# Patient Record
Sex: FEMALE | Race: WHITE | NOT HISPANIC OR LATINO | Employment: FULL TIME | URBAN - NONMETROPOLITAN AREA
[De-identification: names, ages, dates, MRNs, and addresses within clinical notes are randomized per-mention and may not be internally consistent; named-entity substitution may affect disease eponyms.]

---

## 2023-03-22 ENCOUNTER — OFFICE VISIT (OUTPATIENT)
Dept: PRIMARY CARE | Facility: CLINIC | Age: 38
End: 2023-03-22
Payer: COMMERCIAL

## 2023-03-22 VITALS
DIASTOLIC BLOOD PRESSURE: 80 MMHG | BODY MASS INDEX: 47.91 KG/M2 | HEART RATE: 86 BPM | SYSTOLIC BLOOD PRESSURE: 120 MMHG | WEIGHT: 280.6 LBS | OXYGEN SATURATION: 98 % | HEIGHT: 64 IN | TEMPERATURE: 96.9 F

## 2023-03-22 DIAGNOSIS — J30.2 SEASONAL ALLERGIES: ICD-10-CM

## 2023-03-22 DIAGNOSIS — Z00.00 WELLNESS EXAMINATION: Primary | ICD-10-CM

## 2023-03-22 DIAGNOSIS — R11.2 NAUSEA AND VOMITING, UNSPECIFIED VOMITING TYPE: ICD-10-CM

## 2023-03-22 DIAGNOSIS — F90.0 ATTENTION DEFICIT HYPERACTIVITY DISORDER (ADHD), PREDOMINANTLY INATTENTIVE TYPE: ICD-10-CM

## 2023-03-22 PROBLEM — K64.8 OTHER HEMORRHOIDS: Status: ACTIVE | Noted: 2023-03-22

## 2023-03-22 LAB
ALANINE AMINOTRANSFERASE (SGPT) (U/L) IN SER/PLAS: 18 U/L (ref 7–45)
ALBUMIN (G/DL) IN SER/PLAS: 4.5 G/DL (ref 3.4–5)
ALKALINE PHOSPHATASE (U/L) IN SER/PLAS: 113 U/L (ref 33–110)
ANION GAP IN SER/PLAS: 13 MMOL/L (ref 10–20)
ASPARTATE AMINOTRANSFERASE (SGOT) (U/L) IN SER/PLAS: 16 U/L (ref 9–39)
BASOPHILS (10*3/UL) IN BLOOD BY AUTOMATED COUNT: 0.09 X10E9/L (ref 0–0.1)
BASOPHILS/100 LEUKOCYTES IN BLOOD BY AUTOMATED COUNT: 0.9 % (ref 0–2)
BILIRUBIN TOTAL (MG/DL) IN SER/PLAS: 0.3 MG/DL (ref 0–1.2)
CALCIUM (MG/DL) IN SER/PLAS: 9.4 MG/DL (ref 8.6–10.3)
CARBON DIOXIDE, TOTAL (MMOL/L) IN SER/PLAS: 26 MMOL/L (ref 21–32)
CHLORIDE (MMOL/L) IN SER/PLAS: 103 MMOL/L (ref 98–107)
CREATININE (MG/DL) IN SER/PLAS: 0.76 MG/DL (ref 0.5–1.05)
EOSINOPHILS (10*3/UL) IN BLOOD BY AUTOMATED COUNT: 0.34 X10E9/L (ref 0–0.7)
EOSINOPHILS/100 LEUKOCYTES IN BLOOD BY AUTOMATED COUNT: 3.5 % (ref 0–6)
ERYTHROCYTE DISTRIBUTION WIDTH (RATIO) BY AUTOMATED COUNT: 13.2 % (ref 11.5–14.5)
ERYTHROCYTE MEAN CORPUSCULAR HEMOGLOBIN CONCENTRATION (G/DL) BY AUTOMATED: 32.4 G/DL (ref 32–36)
ERYTHROCYTE MEAN CORPUSCULAR VOLUME (FL) BY AUTOMATED COUNT: 88 FL (ref 80–100)
ERYTHROCYTES (10*6/UL) IN BLOOD BY AUTOMATED COUNT: 4.86 X10E12/L (ref 4–5.2)
GFR FEMALE: >90 ML/MIN/1.73M2
GLUCOSE (MG/DL) IN SER/PLAS: 95 MG/DL (ref 74–99)
HEMATOCRIT (%) IN BLOOD BY AUTOMATED COUNT: 42.6 % (ref 36–46)
HEMOGLOBIN (G/DL) IN BLOOD: 13.8 G/DL (ref 12–16)
IMMATURE GRANULOCYTES/100 LEUKOCYTES IN BLOOD BY AUTOMATED COUNT: 0.4 % (ref 0–0.9)
LEUKOCYTES (10*3/UL) IN BLOOD BY AUTOMATED COUNT: 9.6 X10E9/L (ref 4.4–11.3)
LYMPHOCYTES (10*3/UL) IN BLOOD BY AUTOMATED COUNT: 3.09 X10E9/L (ref 1.2–4.8)
LYMPHOCYTES/100 LEUKOCYTES IN BLOOD BY AUTOMATED COUNT: 32.1 % (ref 13–44)
MONOCYTES (10*3/UL) IN BLOOD BY AUTOMATED COUNT: 0.74 X10E9/L (ref 0.1–1)
MONOCYTES/100 LEUKOCYTES IN BLOOD BY AUTOMATED COUNT: 7.7 % (ref 2–10)
NEUTROPHILS (10*3/UL) IN BLOOD BY AUTOMATED COUNT: 5.34 X10E9/L (ref 1.2–7.7)
NEUTROPHILS/100 LEUKOCYTES IN BLOOD BY AUTOMATED COUNT: 55.4 % (ref 40–80)
PLATELETS (10*3/UL) IN BLOOD AUTOMATED COUNT: 370 X10E9/L (ref 150–450)
POTASSIUM (MMOL/L) IN SER/PLAS: 4.8 MMOL/L (ref 3.5–5.3)
PROTEIN TOTAL: 7 G/DL (ref 6.4–8.2)
SODIUM (MMOL/L) IN SER/PLAS: 137 MMOL/L (ref 136–145)
THYROTROPIN (MIU/L) IN SER/PLAS BY DETECTION LIMIT <= 0.05 MIU/L: 1.81 MIU/L (ref 0.44–3.98)
UREA NITROGEN (MG/DL) IN SER/PLAS: 14 MG/DL (ref 6–23)

## 2023-03-22 PROCEDURE — 84436 ASSAY OF TOTAL THYROXINE: CPT

## 2023-03-22 PROCEDURE — 80307 DRUG TEST PRSMV CHEM ANLYZR: CPT

## 2023-03-22 PROCEDURE — 99385 PREV VISIT NEW AGE 18-39: CPT

## 2023-03-22 PROCEDURE — G0483 DRUG TEST DEF 22+ CLASSES: HCPCS

## 2023-03-22 PROCEDURE — 85025 COMPLETE CBC W/AUTO DIFF WBC: CPT

## 2023-03-22 PROCEDURE — 1036F TOBACCO NON-USER: CPT

## 2023-03-22 PROCEDURE — 80053 COMPREHEN METABOLIC PANEL: CPT

## 2023-03-22 PROCEDURE — 84443 ASSAY THYROID STIM HORMONE: CPT

## 2023-03-22 PROCEDURE — 82652 VIT D 1 25-DIHYDROXY: CPT

## 2023-03-22 PROCEDURE — 36415 COLL VENOUS BLD VENIPUNCTURE: CPT

## 2023-03-22 RX ORDER — DEXTROAMPHETAMINE SACCHARATE, AMPHETAMINE ASPARTATE, DEXTROAMPHETAMINE SULFATE AND AMPHETAMINE SULFATE 7.5; 7.5; 7.5; 7.5 MG/1; MG/1; MG/1; MG/1
1 TABLET ORAL 2 TIMES DAILY
COMMUNITY
Start: 2023-02-20

## 2023-03-22 RX ORDER — GABAPENTIN 800 MG/1
1 TABLET ORAL
COMMUNITY
Start: 2023-03-19

## 2023-03-22 RX ORDER — TRIAZOLAM 0.25 MG/1
0.25 TABLET ORAL NIGHTLY
COMMUNITY
Start: 2023-03-19

## 2023-03-22 RX ORDER — FLUTICASONE FUROATE 27.5 UG/1
1 SPRAY, METERED NASAL
Qty: 10 G | Refills: 5 | Status: SHIPPED | OUTPATIENT
Start: 2023-03-22 | End: 2024-04-19 | Stop reason: WASHOUT

## 2023-03-22 ASSESSMENT — ENCOUNTER SYMPTOMS
FEVER: 0
HEADACHES: 0
ALLERGIC/IMMUNOLOGIC NEGATIVE: 1
UNEXPECTED WEIGHT CHANGE: 0
FATIGUE: 0
LOSS OF SENSATION IN FEET: 0
MUSCULOSKELETAL NEGATIVE: 1
ENDOCRINE NEGATIVE: 1
DEPRESSION: 0
ACTIVITY CHANGE: 0
SHORTNESS OF BREATH: 0
GASTROINTESTINAL NEGATIVE: 1
ABDOMINAL PAIN: 0
RESPIRATORY NEGATIVE: 1
WEAKNESS: 0
PSYCHIATRIC NEGATIVE: 1
DIZZINESS: 0
CARDIOVASCULAR NEGATIVE: 1
OCCASIONAL FEELINGS OF UNSTEADINESS: 0

## 2023-03-22 ASSESSMENT — PATIENT HEALTH QUESTIONNAIRE - PHQ9
SUM OF ALL RESPONSES TO PHQ9 QUESTIONS 1 & 2: 0
1. LITTLE INTEREST OR PLEASURE IN DOING THINGS: NOT AT ALL
2. FEELING DOWN, DEPRESSED OR HOPELESS: NOT AT ALL

## 2023-03-22 NOTE — PROGRESS NOTES
Subjective   Patient ID: Ute eLy is a 37 y.o. female who presents for Annual Exam (Ute is here for her yearly wellness. Just moved from [place]. Had blood work done done and her ketones and lymphocytes in her blood work.  Would like more blood work. ), Obesity (Would like to discuss weight loss. ), Vomiting (Yesterday she did have an issues with vomiting. However feels better today. ), and Contraception (Would like to discuss getting a prescription for plan B. Does not want birth control if she can stay away from it. ).  Weight gain rapidly, change in diet from vegetarian. Requesting Munjaro  Weight gain: 240lb to 280 over the past 4 months.     Walking her dog now, trying to eat better, eating cream of wheat for breakfast  Moved to the area   Had labs drawn in years past, showed elevated ketones and lymphocytes, declined seeing hematology.     Looking to see psychiatry for medication review.         Vitals:    03/22/23 0857   BP: 120/80   Pulse: 86   Temp: 36.1 °C (96.9 °F)   SpO2: 98%       Review of Systems   Constitutional:  Negative for activity change, fatigue, fever and unexpected weight change.   HENT: Negative.     Respiratory: Negative.  Negative for shortness of breath.    Cardiovascular: Negative.  Negative for chest pain.   Gastrointestinal: Negative.  Negative for abdominal pain.   Endocrine: Negative.    Musculoskeletal: Negative.    Skin: Negative.    Allergic/Immunologic: Negative.    Neurological:  Negative for dizziness, weakness and headaches.   Psychiatric/Behavioral: Negative.         Objective   Physical Exam  Constitutional:       Appearance: Normal appearance.   HENT:      Head: Normocephalic.      Mouth/Throat:      Mouth: Mucous membranes are moist.   Cardiovascular:      Rate and Rhythm: Normal rate and regular rhythm.      Pulses: Normal pulses.      Heart sounds: Normal heart sounds. No murmur heard.     No friction rub. No gallop.   Pulmonary:      Effort: Pulmonary  effort is normal. No respiratory distress.      Breath sounds: Normal breath sounds. No wheezing.   Abdominal:      General: Bowel sounds are normal. There is no distension.      Palpations: Abdomen is soft.      Tenderness: There is no abdominal tenderness.   Musculoskeletal:         General: No deformity. Normal range of motion.   Skin:     General: Skin is warm and dry.      Capillary Refill: Capillary refill takes less than 2 seconds.   Neurological:      General: No focal deficit present.      Mental Status: She is alert and oriented to person, place, and time.   Psychiatric:         Mood and Affect: Mood normal.         Assessment/Plan   Problem List Items Addressed This Visit          Other    Attention deficit hyperactivity disorder (ADHD), predominantly inattentive type    Relevant Orders    Referral to Psychiatry    POCT waived urine drug screen manually resulted     Other Visit Diagnoses       Wellness examination    -  Primary    Nausea and vomiting, unspecified vomiting type        Relevant Orders    TSH    T4    CBC and Auto Differential    Comprehensive metabolic panel    Vitamin D 1,25 Dihydroxy    Referral to Obstetrics / Gynecology    Seasonal allergies        Relevant Medications    fluticasone (Flonase Sensimist) 27.5 mcg/actuation nasal spray            Time Spent  Prep time on day of patient encounter: 10 minutes  Time spent directly with patient, family or caregiver: 20 minutes  Additional Time Spent on Patient Care Activities: 10 minutes  Documentation Time: 10 minutes  Other Time Spent: 0 minutes  Total: 50 minutes        Thank you for coming in today, please call my office if you have any concerns or questions.     Jean-Pierre BURNETT, CNP

## 2023-03-23 LAB

## 2023-03-25 LAB — VITAMIN D 1,25-DIHYDROXY: 43.9 PG/ML (ref 19.9–79.3)

## 2023-03-27 ENCOUNTER — TELEPHONE (OUTPATIENT)
Dept: PRIMARY CARE | Facility: CLINIC | Age: 38
End: 2023-03-27
Payer: COMMERCIAL

## 2023-03-27 LAB
7-AMINOCLONAZEPAM: <25 NG/ML
ALPHA-HYDROXYALPRAZOLAM: <25 NG/ML
ALPHA-HYDROXYMIDAZOLAM: <25 NG/ML
ALPRAZOLAM: <25 NG/ML
AMPHETAMINES,URINE: >5000 NG/ML
CHLORDIAZEPOXIDE: <25 NG/ML
CLONAZEPAM: <25 NG/ML
DIAZEPAM: <25 NG/ML
LORAZEPAM: <25 NG/ML
MDA,URINE: <200 NG/ML
MDEA,URINE: <200 NG/ML
MDMA,UR: <200 NG/ML
METHAMPHETAMINE QUANTITATIVE URINE: <200 NG/ML
MIDAZOLAM: <25 NG/ML
NORDIAZEPAM: <25 NG/ML
OXAZEPAM: <25 NG/ML
PHENTERMINE,UR: <200 NG/ML
TEMAZEPAM: <25 NG/ML

## 2023-03-27 NOTE — TELEPHONE ENCOUNTER
----- Message from HORTENCIA Weaver-CNP sent at 3/27/2023  8:09 AM EDT -----  Please notify patient of normal labs including a normal thyroid panel. Thanks

## 2023-04-21 ENCOUNTER — OFFICE VISIT (OUTPATIENT)
Dept: PRIMARY CARE | Facility: CLINIC | Age: 38
End: 2023-04-21
Payer: COMMERCIAL

## 2023-04-21 VITALS
DIASTOLIC BLOOD PRESSURE: 80 MMHG | WEIGHT: 281.3 LBS | BODY MASS INDEX: 48.29 KG/M2 | SYSTOLIC BLOOD PRESSURE: 138 MMHG | OXYGEN SATURATION: 97 % | HEART RATE: 108 BPM | TEMPERATURE: 97.4 F

## 2023-04-21 DIAGNOSIS — E66.01 CLASS 3 SEVERE OBESITY WITHOUT SERIOUS COMORBIDITY WITH BODY MASS INDEX (BMI) OF 45.0 TO 49.9 IN ADULT, UNSPECIFIED OBESITY TYPE (MULTI): Primary | ICD-10-CM

## 2023-04-21 PROBLEM — E66.813 CLASS 3 SEVERE OBESITY WITHOUT SERIOUS COMORBIDITY WITH BODY MASS INDEX (BMI) OF 45.0 TO 49.9 IN ADULT: Status: ACTIVE | Noted: 2023-04-21

## 2023-04-21 PROCEDURE — 99213 OFFICE O/P EST LOW 20 MIN: CPT

## 2023-04-21 PROCEDURE — 1036F TOBACCO NON-USER: CPT

## 2023-04-21 PROCEDURE — 3008F BODY MASS INDEX DOCD: CPT

## 2023-04-21 ASSESSMENT — ENCOUNTER SYMPTOMS
MUSCULOSKELETAL NEGATIVE: 1
RESPIRATORY NEGATIVE: 1
GASTROINTESTINAL NEGATIVE: 1
DIZZINESS: 0
PSYCHIATRIC NEGATIVE: 1
ENDOCRINE NEGATIVE: 1
ALLERGIC/IMMUNOLOGIC NEGATIVE: 1
CARDIOVASCULAR NEGATIVE: 1
FEVER: 0
WEAKNESS: 0
SHORTNESS OF BREATH: 0
FATIGUE: 0
ABDOMINAL PAIN: 0
ACTIVITY CHANGE: 0
HEADACHES: 0
UNEXPECTED WEIGHT CHANGE: 0

## 2023-04-21 NOTE — PROGRESS NOTES
Subjective   Patient ID: Ute Ley is a 37 y.o. female who presents for Weight Loss (Ute is here to discuss weight loss.).  Patient presents today 1 month follow-up for weight loss  She is requesting injectable medication to help her lose weight  No other complaints, lab work was reviewed from previous visit reviewed        Vitals:    04/21/23 0925   BP: 138/80   Pulse: 108   Temp: 36.3 °C (97.4 °F)   SpO2: 97%       Review of Systems   Constitutional:  Negative for activity change, fatigue, fever and unexpected weight change.   HENT: Negative.     Respiratory: Negative.  Negative for shortness of breath.    Cardiovascular: Negative.  Negative for chest pain.   Gastrointestinal: Negative.  Negative for abdominal pain.   Endocrine: Negative.    Musculoskeletal: Negative.    Skin: Negative.    Allergic/Immunologic: Negative.    Neurological:  Negative for dizziness, weakness and headaches.   Psychiatric/Behavioral: Negative.     All other systems reviewed and are negative.      Objective   Physical Exam  Vitals reviewed.   Constitutional:       Appearance: Normal appearance.   HENT:      Head: Normocephalic.      Mouth/Throat:      Mouth: Mucous membranes are moist.   Cardiovascular:      Rate and Rhythm: Normal rate and regular rhythm.      Pulses: Normal pulses.      Heart sounds: Normal heart sounds. No murmur heard.     No friction rub. No gallop.   Pulmonary:      Effort: Pulmonary effort is normal. No respiratory distress.      Breath sounds: Normal breath sounds. No wheezing.   Abdominal:      General: Bowel sounds are normal. There is no distension.      Palpations: Abdomen is soft.      Tenderness: There is no abdominal tenderness.   Musculoskeletal:         General: No deformity. Normal range of motion.   Skin:     General: Skin is warm and dry.      Capillary Refill: Capillary refill takes less than 2 seconds.   Neurological:      General: No focal deficit present.      Mental Status: She is alert  and oriented to person, place, and time.   Psychiatric:         Mood and Affect: Mood normal.         Assessment/Plan   Problem List Items Addressed This Visit       Class 3 severe obesity without serious comorbidity with body mass index (BMI) of 45.0 to 49.9 in adult (CMS/[facility]) - Primary    Relevant Medications    semaglutide, weight loss, (WEGOVY) 0.25 mg/0.5 mL pen injector       Time Spent  Prep time on day of patient encounter: 10 minutes  Time spent directly with patient, family or caregiver: 15 minutes  Additional Time Spent on Patient Care Activities: 0 minutes  Documentation Time: 5 minutes  Other Time Spent: 0 minutes  Total: 30 minutes        Thank you for coming in today, please call my office if you have any concerns or questions.     Jean-Pierre BURNETT, CNP

## 2023-04-21 NOTE — PATIENT INSTRUCTIONS
Start Wegovy, see in 1 month for weight check prior to increasing dose.  Thank you for coming in today, if any questions or concerns arise, please call my office.     HORTENCIA Weaver-CNP

## 2023-05-05 ENCOUNTER — OFFICE VISIT (OUTPATIENT)
Dept: PRIMARY CARE | Facility: CLINIC | Age: 38
End: 2023-05-05
Payer: COMMERCIAL

## 2023-05-05 VITALS
DIASTOLIC BLOOD PRESSURE: 88 MMHG | SYSTOLIC BLOOD PRESSURE: 130 MMHG | BODY MASS INDEX: 46.95 KG/M2 | OXYGEN SATURATION: 98 % | HEART RATE: 100 BPM | WEIGHT: 273.5 LBS | TEMPERATURE: 97.5 F

## 2023-05-05 DIAGNOSIS — M54.12 CERVICAL RADICULOPATHY: Primary | ICD-10-CM

## 2023-05-05 PROCEDURE — 99213 OFFICE O/P EST LOW 20 MIN: CPT

## 2023-05-05 PROCEDURE — 1036F TOBACCO NON-USER: CPT

## 2023-05-05 PROCEDURE — 3008F BODY MASS INDEX DOCD: CPT

## 2023-05-05 RX ORDER — CYCLOBENZAPRINE HCL 5 MG
5 TABLET ORAL 2 TIMES DAILY PRN
Qty: 20 TABLET | Refills: 0 | Status: SHIPPED | OUTPATIENT
Start: 2023-05-05 | End: 2023-05-15 | Stop reason: WASHOUT

## 2023-05-05 RX ORDER — PREDNISONE 10 MG/1
30 TABLET ORAL DAILY
Qty: 9 TABLET | Refills: 0 | Status: SHIPPED | OUTPATIENT
Start: 2023-05-05 | End: 2023-05-08

## 2023-05-05 NOTE — PATIENT INSTRUCTIONS
Steroid dose pack  Muscle relaxants    Thank you for coming in today, if any questions or concerns arise, please call my office.   HORTENCIA Weaver-CNP

## 2023-05-05 NOTE — PROGRESS NOTES
Subjective   Patient ID: Ute Ley is a 37 y.o. female who presents for Neck Pain (tUe is here for a right side neck pain that radiates up to her head and down her arm. Started a few days ago with her left side back pain.).  Neck pain x2 days  Difficulties typing at work  Numbness extends downward to her fingers        Vitals:    05/05/23 1123   BP: 130/88   Pulse: 100   Temp: 36.4 °C (97.5 °F)   SpO2: 98%       Review of Systems    Objective   Physical Exam  Vitals reviewed.   Constitutional:       Appearance: Normal appearance.   HENT:      Head: Normocephalic.      Mouth/Throat:      Mouth: Mucous membranes are moist.   Cardiovascular:      Rate and Rhythm: Normal rate and regular rhythm.      Pulses: Normal pulses.      Heart sounds: Normal heart sounds. No murmur heard.     No friction rub. No gallop.   Pulmonary:      Effort: Pulmonary effort is normal. No respiratory distress.      Breath sounds: Normal breath sounds. No wheezing.   Abdominal:      General: Bowel sounds are normal. There is no distension.      Palpations: Abdomen is soft.      Tenderness: There is no abdominal tenderness.   Musculoskeletal:         General: No deformity.      Cervical back: Tenderness and bony tenderness present. Pain with movement present. Decreased range of motion.   Skin:     General: Skin is warm and dry.      Capillary Refill: Capillary refill takes less than 2 seconds.   Neurological:      General: No focal deficit present.      Mental Status: She is alert and oriented to person, place, and time.   Psychiatric:         Mood and Affect: Mood normal.         Assessment/Plan   Problem List Items Addressed This Visit    None  Visit Diagnoses       Cervical radiculopathy    -  Primary    Relevant Medications    predniSONE (Deltasone) 10 mg tablet    cyclobenzaprine (Flexeril) 5 mg tablet            Time Spent  Prep time on day of patient encounter: 10 minutes  Time spent directly with patient, family or caregiver:  15 minutes  Additional Time Spent on Patient Care Activities: 0 minutes  Documentation Time: 10 minutes  Other Time Spent: 0 minutes  Total: 35 minutes        Thank you for coming in today, please call my office if you have any concerns or questions.     Jean-Pierre BURNETT, CNP

## 2023-05-23 ENCOUNTER — OFFICE VISIT (OUTPATIENT)
Dept: PRIMARY CARE | Facility: CLINIC | Age: 38
End: 2023-05-23
Payer: COMMERCIAL

## 2023-05-23 VITALS
TEMPERATURE: 96.7 F | SYSTOLIC BLOOD PRESSURE: 138 MMHG | DIASTOLIC BLOOD PRESSURE: 82 MMHG | BODY MASS INDEX: 46.69 KG/M2 | OXYGEN SATURATION: 98 % | WEIGHT: 272 LBS | HEART RATE: 111 BPM

## 2023-05-23 DIAGNOSIS — E66.01 CLASS 3 SEVERE OBESITY WITHOUT SERIOUS COMORBIDITY WITH BODY MASS INDEX (BMI) OF 45.0 TO 49.9 IN ADULT, UNSPECIFIED OBESITY TYPE (MULTI): ICD-10-CM

## 2023-05-23 PROCEDURE — 99213 OFFICE O/P EST LOW 20 MIN: CPT

## 2023-05-23 PROCEDURE — 1036F TOBACCO NON-USER: CPT

## 2023-05-23 PROCEDURE — 3008F BODY MASS INDEX DOCD: CPT

## 2023-05-23 ASSESSMENT — ENCOUNTER SYMPTOMS: BACK PAIN: 1

## 2023-05-23 NOTE — PROGRESS NOTES
Subjective   Patient ID: Ute Ley is a 37 y.o. female who presents for Follow-up (Ute is here for a follow up. She is still having back pain. Xrays came back clear. ).  Seenby chiropractor andhad adjustments twice weekly starting approximately 1 week ago.   Icing helps the back, now is 2/10 when sitting is severe  Standing desk was given to her at work    Numbness down the left leg        Vitals:    05/23/23 0901   BP: 138/82   Pulse: (!) 111   Temp: 35.9 °C (96.7 °F)   SpO2: 98%       Review of Systems   Musculoskeletal:  Positive for back pain.   All other systems reviewed and are negative.      Objective   Physical Exam  Nursing note reviewed.   Musculoskeletal:      Lumbar back: Tenderness present.   Neurological:      General: No focal deficit present.      Mental Status: She is oriented to person, place, and time. Mental status is at baseline.         Assessment/Plan   Problem List Items Addressed This Visit       Class 3 severe obesity without serious comorbidity with body mass index (BMI) of 45.0 to 49.9 in adult (CMS/HCC)    Relevant Medications    semaglutide, weight loss, (WEGOVY) 0.25 mg/0.5 mL pen injector            Thank you for coming in today, please call my office if you have any concerns or questions.     Jean-Pierre BURNETT, CNP

## 2023-05-23 NOTE — PATIENT INSTRUCTIONS
Continue Wegovy, increase dosage  See in 1 month for follow up, bring syringes we will dispose of sharps here.   Continue Chiropractor adjustments per their recommendations    Thank you for coming in today, if any questions or concerns arise, please call my office.   HORTENCIA Weaver-CNP

## 2023-05-26 ENCOUNTER — OFFICE VISIT (OUTPATIENT)
Dept: PRIMARY CARE | Facility: CLINIC | Age: 38
End: 2023-05-26
Payer: COMMERCIAL

## 2023-05-26 ENCOUNTER — APPOINTMENT (OUTPATIENT)
Dept: PRIMARY CARE | Facility: CLINIC | Age: 38
End: 2023-05-26
Payer: COMMERCIAL

## 2023-05-26 VITALS
TEMPERATURE: 96.4 F | OXYGEN SATURATION: 100 % | WEIGHT: 271.4 LBS | HEART RATE: 80 BPM | SYSTOLIC BLOOD PRESSURE: 138 MMHG | DIASTOLIC BLOOD PRESSURE: 80 MMHG | BODY MASS INDEX: 46.59 KG/M2

## 2023-05-26 DIAGNOSIS — M54.42 ACUTE BILATERAL LOW BACK PAIN WITH LEFT-SIDED SCIATICA: Primary | ICD-10-CM

## 2023-05-26 PROCEDURE — 1036F TOBACCO NON-USER: CPT

## 2023-05-26 PROCEDURE — 99213 OFFICE O/P EST LOW 20 MIN: CPT

## 2023-05-26 PROCEDURE — 3008F BODY MASS INDEX DOCD: CPT

## 2023-05-26 RX ORDER — CYCLOBENZAPRINE HCL 10 MG
10 TABLET ORAL 3 TIMES DAILY PRN
Qty: 50 TABLET | Refills: 0 | Status: SHIPPED | OUTPATIENT
Start: 2023-05-26 | End: 2023-06-12 | Stop reason: WASHOUT

## 2023-05-26 RX ORDER — PREDNISONE 10 MG/1
30 TABLET ORAL DAILY
Qty: 15 TABLET | Refills: 0 | Status: SHIPPED | OUTPATIENT
Start: 2023-05-26 | End: 2023-05-31

## 2023-05-26 ASSESSMENT — ENCOUNTER SYMPTOMS
NUMBNESS: 0
TINGLING: 0
WEIGHT LOSS: 0
DYSURIA: 0
FEVER: 0
BACK PAIN: 1
ABDOMINAL PAIN: 0
BOWEL INCONTINENCE: 0
LEG PAIN: 1
WEAKNESS: 0
HEADACHES: 0
PARESIS: 0
PERIANAL NUMBNESS: 0
PARESTHESIAS: 1

## 2023-05-26 NOTE — PROGRESS NOTES
Subjective   Patient ID: Ute Ley is a 37 y.o. female who presents for Back Pain (Ute is here for Left sided back pain for about a month. Was going to chiropractor. Sitting, standing and laying down hurts. She is getting shooting pain down her left leg. ).  Back Pain  This is a recurrent problem. The current episode started more than 1 month ago. The problem occurs 2 to 4 times per day. The problem has been waxing and waning since onset. The pain is present in the lumbar spine. The quality of the pain is described as shooting. The pain radiates to the left thigh. The pain is at a severity of 8/10. The pain is The same all the time. The symptoms are aggravated by lying down, sitting and standing. Stiffness is present All day. Associated symptoms include leg pain and paresthesias. Pertinent negatives include no abdominal pain, bladder incontinence, bowel incontinence, chest pain, dysuria, fever, headaches, numbness, paresis, pelvic pain, perianal numbness, tingling, weakness or weight loss. Risk factors include history of steroid use, obesity and poor posture.       Vitals:    05/26/23 1052   BP: 138/80   Pulse: 80   Temp: 35.8 °C (96.4 °F)   SpO2: 100%       Review of Systems   Constitutional:  Negative for fever and weight loss.   Cardiovascular:  Negative for chest pain.   Gastrointestinal:  Negative for abdominal pain and bowel incontinence.   Genitourinary:  Negative for bladder incontinence, dysuria and pelvic pain.   Musculoskeletal:  Positive for back pain.   Neurological:  Positive for paresthesias. Negative for tingling, weakness, numbness and headaches.       Objective   Physical Exam  Vitals and nursing note reviewed.   Musculoskeletal:      Cervical back: Normal.      Thoracic back: Normal.      Lumbar back: Spasms, tenderness and bony tenderness present. Decreased range of motion.   Neurological:      Mental Status: She is alert.         Assessment/Plan   Problem List Items Addressed This  Visit    None  Visit Diagnoses       Acute bilateral low back pain with left-sided sciatica    -  Primary    Relevant Medications    predniSONE (Deltasone) 10 mg tablet    cyclobenzaprine (Flexeril) 10 mg tablet    Other Relevant Orders    Referral to Orthopaedic Surgery                 Thank you for coming in today, please call my office if you have any concerns or questions.     Jean-Pierre BURNETT, CNP

## 2023-05-26 NOTE — PATIENT INSTRUCTIONS
See Orthopedics  Continue Chiropractor adjustments    Call if no improvement  Thank you for coming in today, if any questions or concerns arise, please call my office.   HORTENCIA Weaver-CNP

## 2023-06-23 ENCOUNTER — OFFICE VISIT (OUTPATIENT)
Dept: PRIMARY CARE | Facility: CLINIC | Age: 38
End: 2023-06-23
Payer: COMMERCIAL

## 2023-06-23 ENCOUNTER — TELEPHONE (OUTPATIENT)
Dept: PRIMARY CARE | Facility: CLINIC | Age: 38
End: 2023-06-23

## 2023-06-23 VITALS
DIASTOLIC BLOOD PRESSURE: 80 MMHG | TEMPERATURE: 97.1 F | BODY MASS INDEX: 45.54 KG/M2 | SYSTOLIC BLOOD PRESSURE: 130 MMHG | OXYGEN SATURATION: 96 % | HEART RATE: 95 BPM | WEIGHT: 265.3 LBS

## 2023-06-23 DIAGNOSIS — E66.01 CLASS 3 SEVERE OBESITY WITHOUT SERIOUS COMORBIDITY WITH BODY MASS INDEX (BMI) OF 45.0 TO 49.9 IN ADULT, UNSPECIFIED OBESITY TYPE (MULTI): Primary | ICD-10-CM

## 2023-06-23 PROCEDURE — 99214 OFFICE O/P EST MOD 30 MIN: CPT

## 2023-06-23 PROCEDURE — 1036F TOBACCO NON-USER: CPT

## 2023-06-23 PROCEDURE — 3008F BODY MASS INDEX DOCD: CPT

## 2023-06-23 RX ORDER — SEMAGLUTIDE 1 MG/.5ML
1 INJECTION, SOLUTION SUBCUTANEOUS
Qty: 3 ML | Refills: 0 | Status: SHIPPED | OUTPATIENT
Start: 2023-06-23 | End: 2023-06-28

## 2023-06-23 ASSESSMENT — ENCOUNTER SYMPTOMS
FEVER: 0
ACTIVITY CHANGE: 0
FATIGUE: 0
DIZZINESS: 0
RESPIRATORY NEGATIVE: 1
MUSCULOSKELETAL NEGATIVE: 1
DIARRHEA: 1
CONSTIPATION: 1
ENDOCRINE NEGATIVE: 1
PSYCHIATRIC NEGATIVE: 1
CARDIOVASCULAR NEGATIVE: 1
SHORTNESS OF BREATH: 0
ALLERGIC/IMMUNOLOGIC NEGATIVE: 1
ABDOMINAL PAIN: 0
HEADACHES: 0
UNEXPECTED WEIGHT CHANGE: 0
WEAKNESS: 0

## 2023-06-23 NOTE — TELEPHONE ENCOUNTER
No alternative that works with her insurance for weight management. If she misses next week's dose we can try something else.

## 2023-06-23 NOTE — PROGRESS NOTES
Subjective   Patient ID: Ute Ley is a 37 y.o. female who presents for Weight Check (Ute is here for her weight check.).  Patient presents today for weight check  Currently she has lost 7 lbs since starting Wegovy for weight loss.         Vitals:    06/23/23 0854   BP: 130/80   Pulse: 95   Temp: 36.2 °C (97.1 °F)   SpO2: 96%       Review of Systems   Constitutional:  Negative for activity change, fatigue, fever and unexpected weight change.   HENT: Negative.     Respiratory: Negative.  Negative for shortness of breath.    Cardiovascular: Negative.  Negative for chest pain.   Gastrointestinal:  Positive for constipation and diarrhea. Negative for abdominal pain.        Diarrhea and constipation for 3 weeks after Wegovy start, subsided.    Endocrine: Negative.    Musculoskeletal: Negative.    Skin: Negative.    Allergic/Immunologic: Negative.    Neurological:  Negative for dizziness, weakness and headaches.   Psychiatric/Behavioral: Negative.         Objective   Physical Exam  Vitals and nursing note reviewed.   Constitutional:       Appearance: Normal appearance.   HENT:      Head: Normocephalic.      Mouth/Throat:      Mouth: Mucous membranes are moist.   Cardiovascular:      Rate and Rhythm: Normal rate and regular rhythm.      Pulses: Normal pulses.      Heart sounds: Normal heart sounds. No murmur heard.     No friction rub. No gallop.   Pulmonary:      Effort: Pulmonary effort is normal. No respiratory distress.      Breath sounds: Normal breath sounds. No wheezing.   Abdominal:      General: Bowel sounds are normal. There is no distension.      Palpations: Abdomen is soft.      Tenderness: There is no abdominal tenderness.   Musculoskeletal:         General: No deformity. Normal range of motion.   Skin:     General: Skin is warm and dry.      Capillary Refill: Capillary refill takes less than 2 seconds.   Neurological:      General: No focal deficit present.      Mental Status: She is alert and  oriented to person, place, and time.   Psychiatric:         Mood and Affect: Mood normal.         Assessment/Plan   Problem List Items Addressed This Visit       Class 3 severe obesity without serious comorbidity with body mass index (BMI) of 45.0 to 49.9 in adult (CMS/[facility]) - Primary    Relevant Medications    semaglutide, weight loss, (Wegovy) 1 mg/0.5 mL pen injector            Thank you for coming in today, please call my office if you have any concerns or questions.     Jean-Pierre BURNETT, CNP

## 2023-06-23 NOTE — PATIENT INSTRUCTIONS
Continue efforts with weight loss  Exercise, healthy diet    See again 1 month for weight check  Notify the office if you experience side effects from medications    Thank you for coming in today, if any questions or concerns arise, please call my office.   HORTENCIA Weaver-CNP

## 2023-06-23 NOTE — TELEPHONE ENCOUNTER
Wegovy is out of stock at every pharmacy she contacted would like to know if there is an alternative.

## 2023-06-28 RX ORDER — SEMAGLUTIDE 1.7 MG/.75ML
1.7 INJECTION, SOLUTION SUBCUTANEOUS
Qty: 3 ML | Refills: 0 | Status: SHIPPED | OUTPATIENT
Start: 2023-06-28 | End: 2023-07-19 | Stop reason: SDUPTHER

## 2023-06-28 NOTE — TELEPHONE ENCOUNTER
Would like to know if you can increase the dose to 1.7 [facility] in [place] had that dose in stock.

## 2023-06-30 DIAGNOSIS — M54.41 ACUTE BILATERAL LOW BACK PAIN WITH BILATERAL SCIATICA: Primary | ICD-10-CM

## 2023-06-30 DIAGNOSIS — M54.42 ACUTE BILATERAL LOW BACK PAIN WITH BILATERAL SCIATICA: Primary | ICD-10-CM

## 2023-06-30 RX ORDER — PREDNISONE 10 MG/1
10 TABLET ORAL 3 TIMES DAILY
Qty: 15 TABLET | Refills: 0 | Status: SHIPPED | OUTPATIENT
Start: 2023-06-30 | End: 2023-07-05

## 2023-06-30 RX ORDER — CYCLOBENZAPRINE HCL 10 MG
10 TABLET ORAL 3 TIMES DAILY PRN
Qty: 15 TABLET | Refills: 0 | Status: SHIPPED | OUTPATIENT
Start: 2023-06-30 | End: 2023-07-05 | Stop reason: WASHOUT

## 2023-07-19 ENCOUNTER — OFFICE VISIT (OUTPATIENT)
Dept: PRIMARY CARE | Facility: CLINIC | Age: 38
End: 2023-07-19
Payer: COMMERCIAL

## 2023-07-19 VITALS
TEMPERATURE: 98.6 F | HEART RATE: 103 BPM | WEIGHT: 264 LBS | DIASTOLIC BLOOD PRESSURE: 88 MMHG | SYSTOLIC BLOOD PRESSURE: 130 MMHG | BODY MASS INDEX: 45.32 KG/M2 | OXYGEN SATURATION: 93 %

## 2023-07-19 DIAGNOSIS — E66.01 CLASS 3 SEVERE OBESITY WITHOUT SERIOUS COMORBIDITY WITH BODY MASS INDEX (BMI) OF 45.0 TO 49.9 IN ADULT, UNSPECIFIED OBESITY TYPE (MULTI): ICD-10-CM

## 2023-07-19 PROCEDURE — 1036F TOBACCO NON-USER: CPT

## 2023-07-19 PROCEDURE — 99213 OFFICE O/P EST LOW 20 MIN: CPT

## 2023-07-19 PROCEDURE — 3008F BODY MASS INDEX DOCD: CPT

## 2023-07-19 RX ORDER — SEMAGLUTIDE 1.7 MG/.75ML
1.7 INJECTION, SOLUTION SUBCUTANEOUS
Qty: 3 ML | Refills: 0 | Status: SHIPPED | OUTPATIENT
Start: 2023-07-19 | End: 2023-08-21

## 2023-07-19 ASSESSMENT — ENCOUNTER SYMPTOMS
WEAKNESS: 0
GASTROINTESTINAL NEGATIVE: 1
UNEXPECTED WEIGHT CHANGE: 0
MUSCULOSKELETAL NEGATIVE: 1
DIZZINESS: 0
ABDOMINAL PAIN: 0
FATIGUE: 0
ACTIVITY CHANGE: 0
HEADACHES: 0
PSYCHIATRIC NEGATIVE: 1
FEVER: 0
SHORTNESS OF BREATH: 0
ENDOCRINE NEGATIVE: 1
ALLERGIC/IMMUNOLOGIC NEGATIVE: 1
CARDIOVASCULAR NEGATIVE: 1
RESPIRATORY NEGATIVE: 1

## 2023-07-19 NOTE — PROGRESS NOTES
Subjective   Patient ID: Ute Ley is a 37 y.o. female who presents for Weight Check (Ute is here for her weight check. The increased dose, she didn't give herself the full dose due to feeling anxious about it. However the last week she had given the full dose. She did just finished a steroid dose.).  Patient was 281 lbs when starting weight loss medication, since therapy, she now is 264 lbs        Vitals:    07/19/23 0933   BP: 130/88   Pulse: 103   Temp: 37 °C (98.6 °F)   SpO2: 93%       Review of Systems   Constitutional:  Negative for activity change, fatigue, fever and unexpected weight change.   HENT: Negative.     Respiratory: Negative.  Negative for shortness of breath.    Cardiovascular: Negative.  Negative for chest pain.   Gastrointestinal: Negative.  Negative for abdominal pain.   Endocrine: Negative.    Musculoskeletal: Negative.    Skin: Negative.    Allergic/Immunologic: Negative.    Neurological:  Negative for dizziness, weakness and headaches.   Psychiatric/Behavioral: Negative.         Objective   Physical Exam  Vitals and nursing note reviewed.   Constitutional:       Appearance: Normal appearance.   HENT:      Head: Normocephalic.      Mouth/Throat:      Mouth: Mucous membranes are moist.   Cardiovascular:      Rate and Rhythm: Normal rate and regular rhythm.      Pulses: Normal pulses.      Heart sounds: Normal heart sounds. No murmur heard.     No friction rub. No gallop.   Pulmonary:      Effort: Pulmonary effort is normal. No respiratory distress.      Breath sounds: Normal breath sounds. No wheezing.   Abdominal:      General: Bowel sounds are normal. There is no distension.      Palpations: Abdomen is soft.      Tenderness: There is no abdominal tenderness.   Musculoskeletal:         General: No deformity. Normal range of motion.   Skin:     General: Skin is warm and dry.      Capillary Refill: Capillary refill takes less than 2 seconds.   Neurological:      General: No focal  deficit present.      Mental Status: She is alert and oriented to person, place, and time.   Psychiatric:         Mood and Affect: Mood normal.         Assessment/Plan   Problem List Items Addressed This Visit       Class 3 severe obesity without serious comorbidity with body mass index (BMI) of 45.0 to 49.9 in adult (CMS/[facility])    Relevant Medications    semaglutide, weight loss, (Wegovy) 1.7 mg/0.75 mL pen injector            Thank you for coming in today, please call my office if you have any concerns or questions.     Jean-Pierre BURNETT, CNP

## 2023-08-21 ENCOUNTER — OFFICE VISIT (OUTPATIENT)
Dept: PRIMARY CARE | Facility: CLINIC | Age: 38
End: 2023-08-21
Payer: COMMERCIAL

## 2023-08-21 VITALS
BODY MASS INDEX: 45.66 KG/M2 | TEMPERATURE: 97 F | WEIGHT: 266 LBS | DIASTOLIC BLOOD PRESSURE: 88 MMHG | HEART RATE: 112 BPM | OXYGEN SATURATION: 93 % | SYSTOLIC BLOOD PRESSURE: 138 MMHG

## 2023-08-21 DIAGNOSIS — M54.42 ACUTE BILATERAL LOW BACK PAIN WITH LEFT-SIDED SCIATICA: Primary | ICD-10-CM

## 2023-08-21 DIAGNOSIS — E66.01 CLASS 3 SEVERE OBESITY WITHOUT SERIOUS COMORBIDITY WITH BODY MASS INDEX (BMI) OF 45.0 TO 49.9 IN ADULT, UNSPECIFIED OBESITY TYPE (MULTI): ICD-10-CM

## 2023-08-21 PROCEDURE — 3008F BODY MASS INDEX DOCD: CPT

## 2023-08-21 PROCEDURE — 1036F TOBACCO NON-USER: CPT

## 2023-08-21 PROCEDURE — 99213 OFFICE O/P EST LOW 20 MIN: CPT

## 2023-08-21 RX ORDER — CYCLOBENZAPRINE HCL 10 MG
10 TABLET ORAL 3 TIMES DAILY PRN
Qty: 90 TABLET | Refills: 0 | Status: SHIPPED | OUTPATIENT
Start: 2023-08-21 | End: 2023-10-25 | Stop reason: SDUPTHER

## 2023-08-21 RX ORDER — SEMAGLUTIDE 2.4 MG/.75ML
2.4 INJECTION, SOLUTION SUBCUTANEOUS
Qty: 6 ML | Refills: 0 | Status: SHIPPED | OUTPATIENT
Start: 2023-08-21 | End: 2023-10-25 | Stop reason: SDUPTHER

## 2023-08-21 ASSESSMENT — ENCOUNTER SYMPTOMS
ALLERGIC/IMMUNOLOGIC NEGATIVE: 1
CARDIOVASCULAR NEGATIVE: 1
HEADACHES: 0
ABDOMINAL PAIN: 0
WEAKNESS: 0
DIZZINESS: 0
GASTROINTESTINAL NEGATIVE: 1
ENDOCRINE NEGATIVE: 1
FEVER: 0
UNEXPECTED WEIGHT CHANGE: 0
MUSCULOSKELETAL NEGATIVE: 1
FATIGUE: 0
SHORTNESS OF BREATH: 0
ACTIVITY CHANGE: 0
RESPIRATORY NEGATIVE: 1
PSYCHIATRIC NEGATIVE: 1

## 2023-08-21 NOTE — PROGRESS NOTES
Subjective   Patient ID: Ute Ley is a 37 y.o. female who presents for Follow-up (Ute is here for her weight follow up. She just got off vacation.  Back is hurting less now. Went to orthopedic and they did an xray of her spine. Would like a refill of flonase. ).  Weight check today  She has been on Wegovy therapy for weight loss with mild amount of weight loss since starting medicine    She also has chronic back pain which has made exercise difficult  Refill of flexeril sent.     Seen by orthopedics, recommended weight loss as well as PT, starting this next week.         Vitals:    08/21/23 0917   BP: 138/88   Pulse: (!) 112   Temp: 36.1 °C (97 °F)   SpO2: 93%       Review of Systems   Constitutional:  Negative for activity change, fatigue, fever and unexpected weight change.   HENT: Negative.     Respiratory: Negative.  Negative for shortness of breath.    Cardiovascular: Negative.  Negative for chest pain.   Gastrointestinal: Negative.  Negative for abdominal pain.   Endocrine: Negative.    Musculoskeletal: Negative.    Skin: Negative.    Allergic/Immunologic: Negative.    Neurological:  Negative for dizziness, weakness and headaches.   Psychiatric/Behavioral: Negative.         Objective   Physical Exam  Vitals and nursing note reviewed.   Constitutional:       Appearance: Normal appearance.   HENT:      Head: Normocephalic.      Mouth/Throat:      Mouth: Mucous membranes are moist.   Cardiovascular:      Rate and Rhythm: Normal rate and regular rhythm.      Pulses: Normal pulses.      Heart sounds: Normal heart sounds. No murmur heard.     No friction rub. No gallop.   Pulmonary:      Effort: Pulmonary effort is normal. No respiratory distress.      Breath sounds: Normal breath sounds. No wheezing.   Abdominal:      General: Bowel sounds are normal. There is no distension.      Palpations: Abdomen is soft.      Tenderness: There is no abdominal tenderness.   Musculoskeletal:         General: No  deformity. Normal range of motion.   Skin:     General: Skin is warm and dry.      Capillary Refill: Capillary refill takes less than 2 seconds.   Neurological:      General: No focal deficit present.      Mental Status: She is alert and oriented to person, place, and time.   Psychiatric:         Mood and Affect: Mood normal.         Assessment/Plan   Problem List Items Addressed This Visit       Class 3 severe obesity without serious comorbidity with body mass index (BMI) of 45.0 to 49.9 in adult (CMS/[facility])    Relevant Medications    semaglutide, weight loss, (Wegovy) 2.4 mg/0.75 mL pen injector     Other Visit Diagnoses       Acute bilateral low back pain with left-sided sciatica    -  Primary    Relevant Medications    cyclobenzaprine (Flexeril) 10 mg tablet                 Thank you for coming in today, please call my office if you have any concerns or questions.     Jean-Pierre BURNETT, CNP

## 2023-08-21 NOTE — PATIENT INSTRUCTIONS
See in 2 months weight follow up  Start physical therapy.     Thank you for coming in today, if any questions or concerns arise, please call my office.   HORTENCIA Weaver-CNP

## 2023-10-20 ENCOUNTER — APPOINTMENT (OUTPATIENT)
Dept: PRIMARY CARE | Facility: CLINIC | Age: 38
End: 2023-10-20
Payer: COMMERCIAL

## 2023-10-25 ENCOUNTER — OFFICE VISIT (OUTPATIENT)
Dept: PRIMARY CARE | Facility: CLINIC | Age: 38
End: 2023-10-25
Payer: COMMERCIAL

## 2023-10-25 VITALS
BODY MASS INDEX: 44.46 KG/M2 | OXYGEN SATURATION: 100 % | SYSTOLIC BLOOD PRESSURE: 114 MMHG | TEMPERATURE: 97.2 F | WEIGHT: 259 LBS | HEART RATE: 96 BPM | DIASTOLIC BLOOD PRESSURE: 86 MMHG

## 2023-10-25 DIAGNOSIS — M54.42 ACUTE BILATERAL LOW BACK PAIN WITH LEFT-SIDED SCIATICA: ICD-10-CM

## 2023-10-25 DIAGNOSIS — E66.01 CLASS 3 SEVERE OBESITY WITHOUT SERIOUS COMORBIDITY WITH BODY MASS INDEX (BMI) OF 45.0 TO 49.9 IN ADULT, UNSPECIFIED OBESITY TYPE (MULTI): ICD-10-CM

## 2023-10-25 PROCEDURE — 1036F TOBACCO NON-USER: CPT

## 2023-10-25 PROCEDURE — 3008F BODY MASS INDEX DOCD: CPT

## 2023-10-25 PROCEDURE — 99213 OFFICE O/P EST LOW 20 MIN: CPT

## 2023-10-25 RX ORDER — CYCLOBENZAPRINE HCL 10 MG
10 TABLET ORAL 3 TIMES DAILY PRN
Qty: 90 TABLET | Refills: 0 | Status: SHIPPED | OUTPATIENT
Start: 2023-10-25 | End: 2024-04-19 | Stop reason: SDUPTHER

## 2023-10-25 RX ORDER — SEMAGLUTIDE 2.4 MG/.75ML
2.4 INJECTION, SOLUTION SUBCUTANEOUS
Qty: 6 ML | Refills: 0 | Status: SHIPPED | OUTPATIENT
Start: 2023-10-25 | End: 2024-03-19 | Stop reason: ALTCHOICE

## 2023-10-25 ASSESSMENT — ENCOUNTER SYMPTOMS
ENDOCRINE NEGATIVE: 1
WEAKNESS: 0
MUSCULOSKELETAL NEGATIVE: 1
GASTROINTESTINAL NEGATIVE: 1
RESPIRATORY NEGATIVE: 1
PSYCHIATRIC NEGATIVE: 1
HEADACHES: 0
CARDIOVASCULAR NEGATIVE: 1
UNEXPECTED WEIGHT CHANGE: 0
FATIGUE: 0
ALLERGIC/IMMUNOLOGIC NEGATIVE: 1
SHORTNESS OF BREATH: 0
ACTIVITY CHANGE: 0
ABDOMINAL PAIN: 0
FEVER: 0
DIZZINESS: 0

## 2023-10-25 NOTE — PATIENT INSTRUCTIONS
Keep therapy going  See in 2 months follow up    Thank you for coming in today, if any questions or concerns arise, please call my office.   HORTENCIA Weaver-CNP

## 2023-10-25 NOTE — PROGRESS NOTES
Subjective   Patient ID: Ute Ley is a 37 y.o. female who presents for Weight Check.  Wegovy:   What is the patient's goal of therapy? Weight loss  Is this being achieved with current treatment? partially      Patient has demonstrated continued efforts to lose weight, is dedicated to the treatment program and the response to treatment. and I have assessed for the presence or absence of contraindications, adverse effects, and indicators of possible substance abuse that would necessitate cessation of treatment utilizing controlled substance.    Activities of Daily Living:   Is your overall impression that this patient is benefiting (symptom reduction outweighs side effects) from Wegovy therapy? Yes     1. Physical Functioning: Same  2. Family Relationship: Same  3. Social Relationship: Same  4. Mood: Same  5. Sleep Patterns: Same  6. Overall Function: Better                Vitals:    10/25/23 0830   BP: 114/86   Pulse: 96   Temp: 36.2 °C (97.2 °F)   SpO2: 100%       Review of Systems   Constitutional:  Negative for activity change, fatigue, fever and unexpected weight change.   HENT: Negative.     Respiratory: Negative.  Negative for shortness of breath.    Cardiovascular: Negative.  Negative for chest pain.   Gastrointestinal: Negative.  Negative for abdominal pain.   Endocrine: Negative.    Musculoskeletal: Negative.    Skin: Negative.    Allergic/Immunologic: Negative.    Neurological:  Negative for dizziness, weakness and headaches.   Psychiatric/Behavioral: Negative.         Objective   Physical Exam  Vitals and nursing note reviewed.   Constitutional:       General: She is not in acute distress.     Appearance: Normal appearance. She is not ill-appearing.   Neurological:      Mental Status: She is alert.         Assessment/Plan   Problem List Items Addressed This Visit       Class 3 severe obesity without serious comorbidity with body mass index (BMI) of 45.0 to 49.9 in adult (CMS/HCC)    Relevant  Medications    semaglutide, weight loss, (Wegovy) 2.4 mg/0.75 mL pen injector     Other Visit Diagnoses       Acute bilateral low back pain with left-sided sciatica        Relevant Medications    cyclobenzaprine (Flexeril) 10 mg tablet                 Thank you for coming in today, please call my office if you have any concerns or questions.     Jean-Pierre BURNETT, CNP

## 2023-12-20 ENCOUNTER — APPOINTMENT (OUTPATIENT)
Dept: PRIMARY CARE | Facility: CLINIC | Age: 38
End: 2023-12-20
Payer: COMMERCIAL

## 2024-01-04 ENCOUNTER — TELEPHONE (OUTPATIENT)
Dept: PRIMARY CARE | Facility: CLINIC | Age: 39
End: 2024-01-04

## 2024-01-04 DIAGNOSIS — U07.1 ACUTE COVID-19: Primary | ICD-10-CM

## 2024-01-08 ENCOUNTER — APPOINTMENT (OUTPATIENT)
Dept: PRIMARY CARE | Facility: CLINIC | Age: 39
End: 2024-01-08
Payer: COMMERCIAL

## 2024-03-15 ENCOUNTER — APPOINTMENT (OUTPATIENT)
Dept: PRIMARY CARE | Facility: CLINIC | Age: 39
End: 2024-03-15
Payer: COMMERCIAL

## 2024-03-19 ENCOUNTER — OFFICE VISIT (OUTPATIENT)
Dept: PRIMARY CARE | Facility: CLINIC | Age: 39
End: 2024-03-19
Payer: COMMERCIAL

## 2024-03-19 VITALS
HEART RATE: 92 BPM | SYSTOLIC BLOOD PRESSURE: 118 MMHG | DIASTOLIC BLOOD PRESSURE: 82 MMHG | BODY MASS INDEX: 48.41 KG/M2 | OXYGEN SATURATION: 100 % | WEIGHT: 282 LBS | TEMPERATURE: 96.2 F

## 2024-03-19 DIAGNOSIS — E66.01 CLASS 3 SEVERE OBESITY WITHOUT SERIOUS COMORBIDITY WITH BODY MASS INDEX (BMI) OF 45.0 TO 49.9 IN ADULT, UNSPECIFIED OBESITY TYPE (MULTI): Primary | ICD-10-CM

## 2024-03-19 PROCEDURE — 99214 OFFICE O/P EST MOD 30 MIN: CPT

## 2024-03-19 PROCEDURE — 1036F TOBACCO NON-USER: CPT

## 2024-03-19 PROCEDURE — 3008F BODY MASS INDEX DOCD: CPT

## 2024-03-19 RX ORDER — SEMAGLUTIDE 1 MG/.5ML
1 INJECTION, SOLUTION SUBCUTANEOUS
Qty: 2 ML | Refills: 0 | Status: SHIPPED | OUTPATIENT
Start: 2024-03-19 | End: 2024-04-19 | Stop reason: WASHOUT

## 2024-03-19 ASSESSMENT — ENCOUNTER SYMPTOMS
ACTIVITY CHANGE: 0
APPETITE CHANGE: 1

## 2024-03-19 NOTE — PATIENT INSTRUCTIONS
See back in 1 month follow up GLP-1    Thank you for coming in today, if any questions or concerns arise, please call my office.   Jean-Pierre Brown, HORTENCIA-CNP

## 2024-03-19 NOTE — PROGRESS NOTES
Subjective   Patient ID: Ute Ley is a 38 y.o. female who presents for Weight Check (Would like to discuss weight loss options).  Obesity  --Patient did well on Wegovy however decided not to follow-up and gained the weight back  --We did discuss how this medication would likely be a lifelong medicine and that if she stopped the medication again that she would gain the weight back that she had already lost    -- Affect is bizarre today patient is very anxious about her job and pressured speech  --No concerns for diversion of medication, patient demonstrated good compliance previously and we will restart Wegovy at decreased dose        Vitals:    03/19/24 1435   BP: 118/82   Pulse: 92   Temp: 35.7 °C (96.2 °F)   SpO2: 100%       Review of Systems   Constitutional:  Positive for appetite change. Negative for activity change.   All other systems reviewed and are negative.      Objective   Physical Exam  Vitals and nursing note reviewed.   Constitutional:       General: She is not in acute distress.     Appearance: She is not ill-appearing, toxic-appearing or diaphoretic.   Neurological:      Mental Status: She is alert.         Assessment/Plan   Problem List Items Addressed This Visit       Class 3 severe obesity without serious comorbidity with body mass index (BMI) of 45.0 to 49.9 in adult (CMS/HCC) - Primary    Relevant Medications    semaglutide, weight loss, (Wegovy) 1 mg/0.5 mL pen injector            Thank you for coming in today, please call my office if you have any concerns or questions.     Jean-Pierre BURNETT, CNP

## 2024-04-02 ENCOUNTER — TELEPHONE (OUTPATIENT)
Dept: PRIMARY CARE | Facility: CLINIC | Age: 39
End: 2024-04-02
Payer: COMMERCIAL

## 2024-04-02 NOTE — TELEPHONE ENCOUNTER
----- Message from HORTENCIA Weaver-CNP sent at 4/1/2024  8:50 AM EDT -----  Please notify Ute that her Wegovy was denied. I would recommend Dietician consultation, and can place referral if interested. Please provide her with their number. Thankyou

## 2024-04-02 NOTE — LETTER
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 Please contact the office back at [phone] to ensure we have the correct contact information.     Sincerely,     Krzysztof Ocampo MA      CC: No Recipients  ______________________________________________________________________________________

## 2024-04-19 ENCOUNTER — OFFICE VISIT (OUTPATIENT)
Dept: PRIMARY CARE | Facility: CLINIC | Age: 39
End: 2024-04-19
Payer: COMMERCIAL

## 2024-04-19 VITALS
SYSTOLIC BLOOD PRESSURE: 132 MMHG | TEMPERATURE: 96.3 F | DIASTOLIC BLOOD PRESSURE: 88 MMHG | WEIGHT: 283 LBS | BODY MASS INDEX: 48.58 KG/M2 | HEART RATE: 81 BPM | OXYGEN SATURATION: 97 %

## 2024-04-19 DIAGNOSIS — E66.01 CLASS 3 SEVERE OBESITY WITHOUT SERIOUS COMORBIDITY WITH BODY MASS INDEX (BMI) OF 45.0 TO 49.9 IN ADULT, UNSPECIFIED OBESITY TYPE (MULTI): Primary | ICD-10-CM

## 2024-04-19 DIAGNOSIS — M54.42 ACUTE BILATERAL LOW BACK PAIN WITH LEFT-SIDED SCIATICA: ICD-10-CM

## 2024-04-19 PROCEDURE — 99212 OFFICE O/P EST SF 10 MIN: CPT

## 2024-04-19 RX ORDER — CYCLOBENZAPRINE HCL 10 MG
10 TABLET ORAL 3 TIMES DAILY PRN
Qty: 90 TABLET | Refills: 0 | Status: SHIPPED | OUTPATIENT
Start: 2024-04-19 | End: 2024-05-19

## 2024-04-19 NOTE — PROGRESS NOTES
Subjective   Patient ID: Ute Ley is a 38 y.o. female who presents for Weight Check (Ute is here for a weight check. Wegovy got denied, is unsure of what the next step might be. Would like flexeril refilled).  Subjective  Ute Ley is a 38 y.o. female here for discussion regarding weight loss. She has noted a weight gain of approximately 30 pounds over the last 3 month. She feels ideal weight is (not disclosed) pounds. Weight at graduation from high school was (not disclosed) pounds. History of eating disorders: none. There is a family history positive for obesity in the patient. Previous treatments for obesity include prescription appetite suppressants: wegovy, phentermine. Obesity associated medical conditions: none. Obesity associated medications: none. Cardiovascular risk factors besides obesity: obesity (BMI >= 30 kg/m2).    [unfilled]    Objective  Body mass index is 48.58 kg/m².  [unfilled]     Assessment/Plan  Obesity. I assessed Ute to be in a relapse stage with respect to weight loss.  General weight loss/lifestyle modification strategies discussed (elicit support from others; identify saboteurs; non-food rewards, etc).  Informal exercise measures discussed, e.g. taking stairs instead of elevator.  Regular aerobic exercise program discussed.    Dietician consult, Bariatric consults.          Vitals:    04/19/24 1454   BP: 132/88   Pulse: 81   Temp: 35.7 °C (96.3 °F)   SpO2: 97%       Review of Systems    Objective   Physical Exam    Assessment/Plan   Problem List Items Addressed This Visit       Class 3 severe obesity without serious comorbidity with body mass index (BMI) of 45.0 to 49.9 in adult (Multi) - Primary    Relevant Orders    Referral to Bariatric Surgery    Referral to Nutrition Services     Other Visit Diagnoses       Acute bilateral low back pain with left-sided sciatica        Relevant Medications    cyclobenzaprine (Flexeril) 10 mg tablet                 Thank you for  coming in today, please call my office if you have any concerns or questions.     Jean-Pierre BURNETT, CNP

## 2024-08-02 DIAGNOSIS — M54.42 ACUTE BILATERAL LOW BACK PAIN WITH LEFT-SIDED SCIATICA: ICD-10-CM

## 2024-08-02 RX ORDER — CYCLOBENZAPRINE HCL 10 MG
10 TABLET ORAL 3 TIMES DAILY PRN
Qty: 90 TABLET | Refills: 0 | Status: SHIPPED | OUTPATIENT
Start: 2024-08-02 | End: 2024-09-01

## 2024-08-22 ENCOUNTER — TELEPHONE (OUTPATIENT)
Dept: PRIMARY CARE | Facility: CLINIC | Age: 39
End: 2024-08-22
Payer: COMMERCIAL

## 2024-08-22 DIAGNOSIS — U07.1 ACUTE COVID-19: Primary | ICD-10-CM

## 2024-08-22 RX ORDER — ALBUTEROL SULFATE 90 UG/1
2 INHALANT RESPIRATORY (INHALATION) EVERY 4 HOURS PRN
Qty: 8 G | Refills: 0 | Status: SHIPPED | OUTPATIENT
Start: 2024-08-22 | End: 2025-08-22

## 2024-08-22 NOTE — TELEPHONE ENCOUNTER
Ute tested positive for Covid yesterday she has SOB, you order her Paxlovid in January but did not have covid so she is taking it now. She would like something to help her breathe she said it is not bad enough to go to ER. Call [phone]

## 2024-09-21 ENCOUNTER — TELEPHONE (OUTPATIENT)
Dept: PRIMARY CARE | Facility: CLINIC | Age: 39
End: 2024-09-21
Payer: COMMERCIAL

## 2024-09-21 DIAGNOSIS — M54.42 ACUTE BILATERAL LOW BACK PAIN WITH LEFT-SIDED SCIATICA: Primary | ICD-10-CM

## 2024-09-21 RX ORDER — CYCLOBENZAPRINE HCL 10 MG
10 TABLET ORAL 3 TIMES DAILY PRN
Qty: 15 TABLET | Refills: 0 | Status: SHIPPED | OUTPATIENT
Start: 2024-09-21 | End: 2024-09-26

## 2024-10-11 DIAGNOSIS — M54.42 ACUTE BILATERAL LOW BACK PAIN WITH LEFT-SIDED SCIATICA: ICD-10-CM

## 2024-10-11 RX ORDER — CYCLOBENZAPRINE HCL 10 MG
10 TABLET ORAL 3 TIMES DAILY PRN
Qty: 15 TABLET | Refills: 0 | Status: SHIPPED | OUTPATIENT
Start: 2024-10-11 | End: 2024-10-16

## 2024-10-25 DIAGNOSIS — M54.41 ACUTE BILATERAL LOW BACK PAIN WITH BILATERAL SCIATICA: ICD-10-CM

## 2024-10-25 DIAGNOSIS — M54.42 ACUTE BILATERAL LOW BACK PAIN WITH BILATERAL SCIATICA: ICD-10-CM

## 2024-10-25 DIAGNOSIS — M54.42 ACUTE BILATERAL LOW BACK PAIN WITH LEFT-SIDED SCIATICA: ICD-10-CM

## 2024-10-25 RX ORDER — CYCLOBENZAPRINE HCL 10 MG
10 TABLET ORAL 3 TIMES DAILY PRN
Qty: 15 TABLET | Refills: 0 | Status: SHIPPED | OUTPATIENT
Start: 2024-10-25 | End: 2024-10-30

## 2024-10-25 RX ORDER — PREDNISONE 10 MG/1
10 TABLET ORAL 3 TIMES DAILY
Qty: 15 TABLET | Refills: 0 | Status: SHIPPED | OUTPATIENT
Start: 2024-10-25 | End: 2024-10-30

## 2024-11-22 DIAGNOSIS — M54.42 ACUTE BILATERAL LOW BACK PAIN WITH LEFT-SIDED SCIATICA: ICD-10-CM

## 2024-11-22 RX ORDER — CYCLOBENZAPRINE HCL 10 MG
10 TABLET ORAL 3 TIMES DAILY PRN
Qty: 15 TABLET | Refills: 0 | Status: SHIPPED | OUTPATIENT
Start: 2024-11-22 | End: 2024-11-27

## 2024-12-31 ENCOUNTER — TELEPHONE (OUTPATIENT)
Dept: PRIMARY CARE | Facility: CLINIC | Age: 39
End: 2024-12-31
Payer: COMMERCIAL

## 2024-12-31 DIAGNOSIS — U07.1 COVID-19: Primary | ICD-10-CM

## 2024-12-31 RX ORDER — NIRMATRELVIR AND RITONAVIR 300-100 MG
3 KIT ORAL 2 TIMES DAILY
Qty: 30 TABLET | Refills: 0 | Status: SHIPPED | OUTPATIENT
Start: 2024-12-31 | End: 2025-01-05

## 2024-12-31 NOTE — TELEPHONE ENCOUNTER
Fatigue, low energy, sinus congestion, sinus pressure. She tested positive for covid this morning. She is requesting paxlovid.